# Patient Record
Sex: FEMALE | Race: OTHER | HISPANIC OR LATINO
[De-identification: names, ages, dates, MRNs, and addresses within clinical notes are randomized per-mention and may not be internally consistent; named-entity substitution may affect disease eponyms.]

---

## 2023-10-03 ENCOUNTER — APPOINTMENT (OUTPATIENT)
Dept: ANTEPARTUM | Facility: CLINIC | Age: 38
End: 2023-10-03
Payer: MEDICAID

## 2023-10-03 ENCOUNTER — ASOB RESULT (OUTPATIENT)
Age: 38
End: 2023-10-03

## 2023-10-03 PROCEDURE — 93976 VASCULAR STUDY: CPT

## 2023-10-03 PROCEDURE — 76813 OB US NUCHAL MEAS 1 GEST: CPT

## 2023-11-14 ENCOUNTER — APPOINTMENT (OUTPATIENT)
Dept: ANTEPARTUM | Facility: CLINIC | Age: 38
End: 2023-11-14

## 2023-11-21 ENCOUNTER — ASOB RESULT (OUTPATIENT)
Age: 38
End: 2023-11-21

## 2023-11-21 ENCOUNTER — APPOINTMENT (OUTPATIENT)
Dept: ANTEPARTUM | Facility: CLINIC | Age: 38
End: 2023-11-21
Payer: MEDICAID

## 2023-11-21 PROCEDURE — 76811 OB US DETAILED SNGL FETUS: CPT

## 2023-12-19 ENCOUNTER — APPOINTMENT (OUTPATIENT)
Dept: ANTEPARTUM | Facility: CLINIC | Age: 38
End: 2023-12-19
Payer: MEDICAID

## 2023-12-19 ENCOUNTER — ASOB RESULT (OUTPATIENT)
Age: 38
End: 2023-12-19

## 2023-12-19 PROCEDURE — 76816 OB US FOLLOW-UP PER FETUS: CPT

## 2024-01-16 ENCOUNTER — APPOINTMENT (OUTPATIENT)
Dept: ANTEPARTUM | Facility: CLINIC | Age: 39
End: 2024-01-16
Payer: MEDICAID

## 2024-01-16 ENCOUNTER — ASOB RESULT (OUTPATIENT)
Age: 39
End: 2024-01-16

## 2024-01-16 PROCEDURE — 76820 UMBILICAL ARTERY ECHO: CPT | Mod: 59

## 2024-01-16 PROCEDURE — 76819 FETAL BIOPHYS PROFIL W/O NST: CPT

## 2024-01-16 PROCEDURE — 76816 OB US FOLLOW-UP PER FETUS: CPT

## 2024-02-06 ENCOUNTER — ASOB RESULT (OUTPATIENT)
Age: 39
End: 2024-02-06

## 2024-02-06 ENCOUNTER — APPOINTMENT (OUTPATIENT)
Dept: ANTEPARTUM | Facility: CLINIC | Age: 39
End: 2024-02-06
Payer: MEDICAID

## 2024-02-06 PROCEDURE — 76816 OB US FOLLOW-UP PER FETUS: CPT

## 2024-02-06 PROCEDURE — 76820 UMBILICAL ARTERY ECHO: CPT | Mod: 59

## 2024-02-06 PROCEDURE — 76819 FETAL BIOPHYS PROFIL W/O NST: CPT

## 2024-03-05 ENCOUNTER — ASOB RESULT (OUTPATIENT)
Age: 39
End: 2024-03-05

## 2024-03-05 ENCOUNTER — APPOINTMENT (OUTPATIENT)
Dept: ANTEPARTUM | Facility: CLINIC | Age: 39
End: 2024-03-05
Payer: MEDICAID

## 2024-03-05 PROCEDURE — 76820 UMBILICAL ARTERY ECHO: CPT | Mod: 59

## 2024-03-05 PROCEDURE — 76816 OB US FOLLOW-UP PER FETUS: CPT

## 2024-03-05 PROCEDURE — 76817 TRANSVAGINAL US OBSTETRIC: CPT

## 2024-03-05 PROCEDURE — 76818 FETAL BIOPHYS PROFILE W/NST: CPT

## 2024-03-05 PROCEDURE — 76821 MIDDLE CEREBRAL ARTERY ECHO: CPT | Mod: 59

## 2024-03-12 ENCOUNTER — APPOINTMENT (OUTPATIENT)
Dept: ANTEPARTUM | Facility: CLINIC | Age: 39
End: 2024-03-12
Payer: MEDICAID

## 2024-03-12 ENCOUNTER — ASOB RESULT (OUTPATIENT)
Age: 39
End: 2024-03-12

## 2024-03-12 PROCEDURE — 76820 UMBILICAL ARTERY ECHO: CPT | Mod: 59

## 2024-03-12 PROCEDURE — 76821 MIDDLE CEREBRAL ARTERY ECHO: CPT | Mod: 59

## 2024-03-12 PROCEDURE — 76818 FETAL BIOPHYS PROFILE W/NST: CPT

## 2024-03-19 ENCOUNTER — APPOINTMENT (OUTPATIENT)
Dept: ANTEPARTUM | Facility: CLINIC | Age: 39
End: 2024-03-19
Payer: MEDICAID

## 2024-03-19 ENCOUNTER — ASOB RESULT (OUTPATIENT)
Age: 39
End: 2024-03-19

## 2024-03-19 PROCEDURE — 76820 UMBILICAL ARTERY ECHO: CPT | Mod: 59

## 2024-03-19 PROCEDURE — 76817 TRANSVAGINAL US OBSTETRIC: CPT

## 2024-03-19 PROCEDURE — 76816 OB US FOLLOW-UP PER FETUS: CPT

## 2024-03-19 PROCEDURE — 76819 FETAL BIOPHYS PROFIL W/O NST: CPT

## 2024-03-22 ENCOUNTER — OUTPATIENT (OUTPATIENT)
Dept: OUTPATIENT SERVICES | Facility: HOSPITAL | Age: 39
LOS: 1 days | End: 2024-03-22
Payer: MEDICAID

## 2024-03-22 VITALS
OXYGEN SATURATION: 98 % | HEART RATE: 70 BPM | DIASTOLIC BLOOD PRESSURE: 80 MMHG | RESPIRATION RATE: 18 BRPM | SYSTOLIC BLOOD PRESSURE: 124 MMHG | TEMPERATURE: 98 F

## 2024-03-22 VITALS
RESPIRATION RATE: 18 BRPM | DIASTOLIC BLOOD PRESSURE: 80 MMHG | OXYGEN SATURATION: 98 % | SYSTOLIC BLOOD PRESSURE: 124 MMHG | TEMPERATURE: 98 F | HEART RATE: 90 BPM

## 2024-03-22 DIAGNOSIS — O26.899 OTHER SPECIFIED PREGNANCY RELATED CONDITIONS, UNSPECIFIED TRIMESTER: ICD-10-CM

## 2024-03-22 DIAGNOSIS — Z98.890 OTHER SPECIFIED POSTPROCEDURAL STATES: Chronic | ICD-10-CM

## 2024-03-22 PROCEDURE — 99214 OFFICE O/P EST MOD 30 MIN: CPT

## 2024-03-22 PROCEDURE — 76818 FETAL BIOPHYS PROFILE W/NST: CPT

## 2024-03-22 PROCEDURE — 59025 FETAL NON-STRESS TEST: CPT | Mod: 26,59

## 2024-03-22 PROCEDURE — 76818 FETAL BIOPHYS PROFILE W/NST: CPT | Mod: 26

## 2024-03-22 PROCEDURE — 59025 FETAL NON-STRESS TEST: CPT

## 2024-03-22 PROCEDURE — 99212 OFFICE O/P EST SF 10 MIN: CPT | Mod: TH,25,GC

## 2024-03-22 RX ORDER — METOCLOPRAMIDE HCL 10 MG
10 TABLET ORAL ONCE
Refills: 0 | Status: DISCONTINUED | OUTPATIENT
Start: 2024-03-22 | End: 2024-04-05

## 2024-03-22 RX ORDER — MECLIZINE HCL 12.5 MG
25 TABLET ORAL ONCE
Refills: 0 | Status: DISCONTINUED | OUTPATIENT
Start: 2024-03-22 | End: 2024-04-05

## 2024-03-22 NOTE — OB RN TRIAGE NOTE - NSNURSINGINSTR_OBGYN_ALL_OB_FT
Pt discharged home. Dr. Wallace reviewed discharge paperwork with patient. All questions and concerns were addressed. Pt verbalized understanding. No further complaints made at this time. Vital signs WNL. Pt stable upon leaving unit.

## 2024-03-22 NOTE — OB PROVIDER TRIAGE NOTE - HISTORY OF PRESENT ILLNESS
ROLY IQBAL is a 37yo  at 37+6  presenting for lightheadedness and dizziness after waking up this morning at 5:45am. She also had episode of nausea. No vomiting or blurry vision. No headache, chest pain, SOB, RUQ or epigastric pain. Denies LOF, VB, contractions. +FM.     Ante: Spontaneous conception. NIPT low risk. Anatomy scan significant for vilamentous cord insertion with vessels near right cervix connected the two lobes of the placenta - posterior placenta with anterior accessory lobe. Passed GCT. Denies elevated BP. GBS unknown. EFW 2900g.     OBHx: G1 - VTOP D&C    G2 - current   GYNHx: denies fibroids, cysts STIs. Previous abnormal pap smear many years aog, normal colpo     PMH: denies  PSH: denies   Meds: PNV  Allergies: Naproxen - hives     PE:  T(C): --  HR: 90 (24 @ 10:09) (90 - 90)  BP: 124/80 (24 @ 10:09) (124/80 - 124/80)  RR: 18 (24 @ 10:09) (18 - 18)  SpO2: 98% (24 @ 10:09) (98% - 98%)  GEN: well-appearing, NAD  PULM: no increased WOB  ABD: soft, nontender, gravid  EXT: mild LE edema  TAUS: ***  SVE: ***    FHT: baseline ***, moderate variability, +accels, no decels  TOCO: ctx ***  reactive & reassuring     A&P: 37yo G*P* at *** presents ***. Fetal status reassuring.   - Admit to L&D  - Consents signed  - PN reviewed, GBS***  - NPO, IVF  - continuous tocometry, FHT   - IOL with ***    D/W    D/W   ROLY IQBAL is a 37yo  at 37+6  presenting for dizziness after waking up this morning at 5:45am. Patient describes the sensation as the room spinning around her. She reports the vertigo is constant. She also had episode of nausea. No vomiting or blurry vision. No headache, chest pain, SOB, RUQ or epigastric pain. Denies LOF, VB, contractions. +FM. Patient reports that she has not had vertigo this pregnancy before; however, she had episode 15 years ago that spontaneously resolved after a couple of days.     Ante: Spontaneous conception. NIPT low risk. Anatomy scan significant for vilamentous cord insertion with vessels near right cervix connected the two lobes of the placenta - posterior placenta with anterior accessory lobe. Passed GCT. Denies elevated BP. GBS unknown. EFW 2900g.     OBHx: G1 - VTOP D&C    G2 - current   GYNHx: denies fibroids, cysts STIs. Previous abnormal pap smear many years aog, normal colpo     PMH: denies  PSH: denies   Meds: PNV  Allergies: Naproxen - hives     PE:  T(C): --  HR: 90 (24 @ 10:09) (90 - 90)  BP: 124/80 (24 @ 10:09) (124/80 - 124/80)  RR: 18 (24 @ 10:09) (18 - 18)  SpO2: 98% (24 @ 10:09) (98% - 98%)  GEN: well-appearing, NAD  PULM: no increased WOB  ABD: soft, nontender, gravid  EXT: mild LE edema  TAUS: cephalic, BPP 8/8, MVP 4.3cm   SVE: Fingertip/long/posterior     FHT: baseline 135, moderate variability, +accels, no decels  TOCO: no contractions   reactive & reassuring     A&P: 37yo  at 37+6 presents for symptoms of vertigo.  Fetal status reassuring. Patient hemodynamically and clinically stable.   - Recommend meclizine and reglan for symptoms; however, patient declined medications at this time as she feels it will resolved shortly on its own. Discussed importance of eating small meal immediately when awakening and explained importance of not quickly adjusting her positions as to avoid any syncopal episodes or worsening or vertigo.   - Discussed strict return precautions. Reviewed s/s of labor. Reviewed s/s of preeclampsia     D/W Dr. Dawson   D/W Dr. Haskins

## 2024-03-22 NOTE — OB RN TRIAGE NOTE - FALL HARM RISK - HARM RISK INTERVENTIONS

## 2024-03-22 NOTE — OB PROVIDER TRIAGE NOTE - NSOBPROVIDERNOTE_OBGYN_ALL_OB_FT
Subjective:  ROLY IQBAL is a 39 yo  at 37w6d presents for contractions and vaginal bleeding. She began noticing 1 or 2 spots on her underwear today after she was seen an examined in triage today. She reports 2 spots of dark brown blood on her underwear without any active bleeding. Additionally, she is endorsing irregular and moderately painful contractions every 3 minutes. She is not wanting an epidural at this time. She reports good fetal movements; denies leakage of fluid.  She was previously seen in triage this morning for dizziness noted to be fingertip and long, and was discharged in stable condition.    Ante: Spontaneous conception. NIPT low risk. Anatomy scan significant for velamentous cord insertion with vessels near right cervix connected the two lobes of the placenta - posterior placenta with anterior accessory lobe. Passed GCT. Denies elevated BP. GBS unknown. EFW 2900g.     OB: G1 - VTOP D&C ; G2 - current   GYN: denies fibroids, cysts STIs. Previous abnormal pap smear many years ago, normal colpo     PMH: denies  PSH: denies   Meds: PNV  Allergies: Naproxen - hives           Objective:    T(C): 36.7 (24 @ 23:04), Max: 36.7 (24 @ 10:09)  HR: 70 (24 @ 23:04) (70 - 90)  BP: 124/80 (-24 @ 23:04) (124/80 - 124/80)  RR: 18 (24 @ 23:04) (18 - 18)  SpO2: 98% (24 @ 23:04) (98% - 98%)    General: No apparent distress; Lying comfortably in bed  Pulmonary: No increased work of breathing  Abdomen: Soft; Nontender; Gravid  Extremities: Trace pedal edema bilaterally; No calf tenderness bilaterally  Neurological: Gross movements intact  Psychiatric: Appropriate mood and affect  Skin: No rashes or jaundice    SVE: fingertip/long  TAUS: cephalic fetus; anterior/posterior placenta; BPP 8/8; DVP 6.0cm  (sonogram attached)    FHT: baseline 125; moderate variability; +accels; -decels; reactive and reassuring   TOCO: irregular contractions q2-4min                      Assessment:  39 yo  at 37w6d presents with contractions and minimal vaginal spotting. Minimal concern for active or concerning bleeding. Patient endorsing symptoms of early labor however making minimal cervical change. Fetal status is reassuring.      Plan:  - Discharge home in stable condition  - Discussed strict labor precautions with patient including leakage of fluid, decreased fetal movements, heavy vaginal bleeding, or more painful/regular contractions  - All questions were answered and concerns addressed. Patient verbally expressed understanding.  - Discussed with senior resident Dr. Wallace  - Discussed with attending physician Dr. Bazan

## 2024-03-22 NOTE — OB RN TRIAGE NOTE - NS_SOURCEOFINFO_OBGYN_ALL_OB
Caller would like to discuss an/a Return call for work excuse. Writer advised caller of callback within 24-72 hours.    Patient Name: Santiago M Thompson  Caller Name: self  Name of Facility: [facility] Response: Call vs. Reply to [email or web address] Message  Callback Number: [phone]  Best Availability: anytime  Can A Detailed Message Be left? yes  Fax Number: na  Additional Info: mother is calling to get work excuse for patient stating he is still under care for his mental health issues. He will need this excuse through next week when he sees provider. Please call to discuss   Did you confirm the message with the caller?: yes    Thank you,  Shari Davenport  
Is it ok to provider letter?   
Letter created and sent via [employer] [facility]    Also spoke to giovanna and informed   
Ok for letter  
Patient

## 2024-03-25 DIAGNOSIS — O26.853 SPOTTING COMPLICATING PREGNANCY, THIRD TRIMESTER: ICD-10-CM

## 2024-03-25 DIAGNOSIS — R42 DIZZINESS AND GIDDINESS: ICD-10-CM

## 2024-03-25 DIAGNOSIS — O43.123 VELAMENTOUS INSERTION OF UMBILICAL CORD, THIRD TRIMESTER: ICD-10-CM

## 2024-03-25 DIAGNOSIS — Z3A.37 37 WEEKS GESTATION OF PREGNANCY: ICD-10-CM

## 2024-03-25 DIAGNOSIS — O09.523 SUPERVISION OF ELDERLY MULTIGRAVIDA, THIRD TRIMESTER: ICD-10-CM

## 2024-03-25 DIAGNOSIS — O26.893 OTHER SPECIFIED PREGNANCY RELATED CONDITIONS, THIRD TRIMESTER: ICD-10-CM

## 2024-03-25 DIAGNOSIS — R11.0 NAUSEA: ICD-10-CM

## 2024-03-25 DIAGNOSIS — O47.1 FALSE LABOR AT OR AFTER 37 COMPLETED WEEKS OF GESTATION: ICD-10-CM

## 2024-03-26 ENCOUNTER — APPOINTMENT (OUTPATIENT)
Dept: ANTEPARTUM | Facility: CLINIC | Age: 39
End: 2024-03-26

## 2024-04-02 ENCOUNTER — ASOB RESULT (OUTPATIENT)
Age: 39
End: 2024-04-02

## 2024-04-02 ENCOUNTER — APPOINTMENT (OUTPATIENT)
Dept: ANTEPARTUM | Facility: CLINIC | Age: 39
End: 2024-04-02
Payer: MEDICAID

## 2024-04-02 PROBLEM — Z78.9 OTHER SPECIFIED HEALTH STATUS: Chronic | Status: ACTIVE | Noted: 2024-03-22

## 2024-04-02 PROCEDURE — 76816 OB US FOLLOW-UP PER FETUS: CPT

## 2024-04-02 PROCEDURE — 76820 UMBILICAL ARTERY ECHO: CPT | Mod: 59

## 2024-04-02 PROCEDURE — 76819 FETAL BIOPHYS PROFIL W/O NST: CPT

## 2024-04-02 PROCEDURE — 76817 TRANSVAGINAL US OBSTETRIC: CPT

## 2024-04-03 PROBLEM — Z00.00 ENCOUNTER FOR PREVENTIVE HEALTH EXAMINATION: Status: ACTIVE | Noted: 2024-04-03

## 2024-04-08 ENCOUNTER — OUTPATIENT (OUTPATIENT)
Dept: OUTPATIENT SERVICES | Facility: HOSPITAL | Age: 39
LOS: 1 days | End: 2024-04-08
Payer: MEDICAID

## 2024-04-08 DIAGNOSIS — Z01.818 ENCOUNTER FOR OTHER PREPROCEDURAL EXAMINATION: ICD-10-CM

## 2024-04-08 DIAGNOSIS — Z98.890 OTHER SPECIFIED POSTPROCEDURAL STATES: Chronic | ICD-10-CM

## 2024-04-08 LAB
ANION GAP SERPL CALC-SCNC: 10 MMOL/L — SIGNIFICANT CHANGE UP (ref 5–17)
BASOPHILS # BLD AUTO: 0.03 K/UL — SIGNIFICANT CHANGE UP (ref 0–0.2)
BASOPHILS NFR BLD AUTO: 0.4 % — SIGNIFICANT CHANGE UP (ref 0–2)
BLD GP AB SCN SERPL QL: NEGATIVE — SIGNIFICANT CHANGE UP
BUN SERPL-MCNC: 4 MG/DL — LOW (ref 7–23)
CALCIUM SERPL-MCNC: 9.9 MG/DL — SIGNIFICANT CHANGE UP (ref 8.4–10.5)
CHLORIDE SERPL-SCNC: 105 MMOL/L — SIGNIFICANT CHANGE UP (ref 96–108)
CO2 SERPL-SCNC: 22 MMOL/L — SIGNIFICANT CHANGE UP (ref 22–31)
CREAT SERPL-MCNC: 0.6 MG/DL — SIGNIFICANT CHANGE UP (ref 0.5–1.3)
EGFR: 116 ML/MIN/1.73M2 — SIGNIFICANT CHANGE UP
EOSINOPHIL # BLD AUTO: 0.07 K/UL — SIGNIFICANT CHANGE UP (ref 0–0.5)
EOSINOPHIL NFR BLD AUTO: 0.9 % — SIGNIFICANT CHANGE UP (ref 0–6)
GLUCOSE SERPL-MCNC: 84 MG/DL — SIGNIFICANT CHANGE UP (ref 70–99)
HCT VFR BLD CALC: 35.4 % — SIGNIFICANT CHANGE UP (ref 34.5–45)
HGB BLD-MCNC: 12 G/DL — SIGNIFICANT CHANGE UP (ref 11.5–15.5)
IMM GRANULOCYTES NFR BLD AUTO: 1.4 % — HIGH (ref 0–0.9)
LYMPHOCYTES # BLD AUTO: 2 K/UL — SIGNIFICANT CHANGE UP (ref 1–3.3)
LYMPHOCYTES # BLD AUTO: 25.7 % — SIGNIFICANT CHANGE UP (ref 13–44)
MCHC RBC-ENTMCNC: 32.6 PG — SIGNIFICANT CHANGE UP (ref 27–34)
MCHC RBC-ENTMCNC: 33.9 GM/DL — SIGNIFICANT CHANGE UP (ref 32–36)
MCV RBC AUTO: 96.2 FL — SIGNIFICANT CHANGE UP (ref 80–100)
MONOCYTES # BLD AUTO: 0.46 K/UL — SIGNIFICANT CHANGE UP (ref 0–0.9)
MONOCYTES NFR BLD AUTO: 5.9 % — SIGNIFICANT CHANGE UP (ref 2–14)
NEUTROPHILS # BLD AUTO: 5.1 K/UL — SIGNIFICANT CHANGE UP (ref 1.8–7.4)
NEUTROPHILS NFR BLD AUTO: 65.7 % — SIGNIFICANT CHANGE UP (ref 43–77)
NRBC # BLD: 0 /100 WBCS — SIGNIFICANT CHANGE UP (ref 0–0)
PLATELET # BLD AUTO: 207 K/UL — SIGNIFICANT CHANGE UP (ref 150–400)
POTASSIUM SERPL-MCNC: 4.3 MMOL/L — SIGNIFICANT CHANGE UP (ref 3.5–5.3)
POTASSIUM SERPL-SCNC: 4.3 MMOL/L — SIGNIFICANT CHANGE UP (ref 3.5–5.3)
RBC # BLD: 3.68 M/UL — LOW (ref 3.8–5.2)
RBC # FLD: 12.7 % — SIGNIFICANT CHANGE UP (ref 10.3–14.5)
RH IG SCN BLD-IMP: POSITIVE — SIGNIFICANT CHANGE UP
SODIUM SERPL-SCNC: 137 MMOL/L — SIGNIFICANT CHANGE UP (ref 135–145)
WBC # BLD: 7.77 K/UL — SIGNIFICANT CHANGE UP (ref 3.8–10.5)
WBC # FLD AUTO: 7.77 K/UL — SIGNIFICANT CHANGE UP (ref 3.8–10.5)

## 2024-04-08 PROCEDURE — 86900 BLOOD TYPING SEROLOGIC ABO: CPT

## 2024-04-08 PROCEDURE — 80048 BASIC METABOLIC PNL TOTAL CA: CPT

## 2024-04-08 PROCEDURE — 86901 BLOOD TYPING SEROLOGIC RH(D): CPT

## 2024-04-08 PROCEDURE — 86850 RBC ANTIBODY SCREEN: CPT

## 2024-04-08 PROCEDURE — 85025 COMPLETE CBC W/AUTO DIFF WBC: CPT

## 2024-04-09 ENCOUNTER — OUTPATIENT (OUTPATIENT)
Dept: OUTPATIENT SERVICES | Facility: HOSPITAL | Age: 39
LOS: 1 days | End: 2024-04-09
Payer: MEDICAID

## 2024-04-09 VITALS
DIASTOLIC BLOOD PRESSURE: 76 MMHG | RESPIRATION RATE: 18 BRPM | OXYGEN SATURATION: 99 % | TEMPERATURE: 98 F | HEART RATE: 75 BPM | SYSTOLIC BLOOD PRESSURE: 127 MMHG

## 2024-04-09 DIAGNOSIS — Z98.890 OTHER SPECIFIED POSTPROCEDURAL STATES: Chronic | ICD-10-CM

## 2024-04-09 DIAGNOSIS — O26.899 OTHER SPECIFIED PREGNANCY RELATED CONDITIONS, UNSPECIFIED TRIMESTER: ICD-10-CM

## 2024-04-09 PROCEDURE — 99214 OFFICE O/P EST MOD 30 MIN: CPT

## 2024-04-09 NOTE — OB PROVIDER TRIAGE NOTE - HISTORY OF PRESENT ILLNESS
ROLY IQBAL is a 37yo  at 40w3d presenting for painful ctx. She started yazmin last night, now reports contractions are "1000 out of 10" happening "constantly". She denies LOF, VB, and is feeling the baby move. She does not want an epidural and wants to go unmedicated through labor including avoiding Pitocin "unless absolutely necessary."     Ante: Spontaneous conception. NIPT LR. Anatomy scan wnl. Velamentous cord insertion with two placental lobes, no vasa previa. Passed GCT. Denies elevated BP. GBS-. EFW 3175g    OBHx: G1 VTOP D&C . G2 current  GYNHx: abnormal pap s/p colposcopy in , pap normal since, no LEEP. She denies STI, fibroids, ovarian cysts  PMHx: denies  meds: PNV  PSH: denies   allergies: Aleve (hives) takes ibuprofen without reaction    PE:  T(C): 36.5 (24 @ 06:17), Max: 36.5 (24 @ 06:17)  HR: 75 (24 @ 06:17) (75 - 75)  BP: 127/76 (24 @ 06:17) (127/76 - 127/76)  RR: 18 (24 @ 06:17) (18 - 18)  SpO2: 99% (24 @ 06:17) (99% - 99%)  GEN: well-appearing, NAD  PULM: no increased WOB  ABD: soft, nontender, gravid  EXT: mild LE edema  TAUS: vertex, BPP 8/8, DVP 4.1cm, posterior placenta   ultrasound printed and attached to chart   SVE: 1/L    FHT: baseline 135, moderate variability, +accels, no decels  TOCO: ctx q2-3min  reactive & reassuring     A&P:  37yo  at 40w3d presenting in early labor. Patient does not desire epidural and would like to avoid augmentation of labor. Discussed that given fetal status reassuring with BPP 10/10 patient can continue to labor at home (lives 15min away in the [place]) until ctx q2min and becoming more intense and/or if feeling rectal pressure. Also counseled to return if LOF, VB, or decreased fetal movements. Patient and partner in agreement with plan.   - safe to d/c home  - reviewed return precautions as above     D/W Dr. James PGY3  D/W Dr. Choco MARTINEZ attending

## 2024-04-09 NOTE — OB PROVIDER H&P - HISTORY OF PRESENT ILLNESS
ROLY IQBAL is a 39yo  at 40w3d presenting for increasing painful contractions. Pt reports that contractions started at 10:30pm happening every 10 min, and overnight progressed to being every 1-4 minutes. Pt follows with Dr. Reza and was scheduled to come in for induction tomorrow. She reports being able to feel baby kick. She reports no vaginal leaking of fluid or blood.       Ante: Spontaneous conception. NIPT HR. Anatomy scan wnl. Passed GCT. Denies elevated BP. GBS negative. EFW 3175 grams.     OBHx: G1 - . VTOP - procedure             G2 - current - has vilamentous cord insertion   GYNHx: hx of one abnormal pap in  for which she had colposcopy which was normal, no other STI, small anterior fibroid, hx of one small ovarian cysts last year that has resolved  PMHx: denies  meds: PNV, iron tablet QD  PSH: none  allergies: none     PE:  Vital Signs Last 24 Hrs  T(C): 36.5 (2024 06:17), Max: 36.5 (2024 06:17)  T(F): 97.7 (2024 06:17), Max: 97.7 (2024 06:17)  HR: 75 (2024 06:17) (75 - 75)  BP: 127/76 (2024 06:17) (127/76 - 127/76)  RR: 18 (2024 06:17) (18 - 18)  SpO2: 99% (2024 06:17) (99% - 99%)  GEN: well-appearing, NAD  PULM: no increased WOB  ABD: soft, nontender, gravid  EXT: mild LE edema  TAUS: cephalic  ultrasound printed and attached to chart   SVE: 1cm/long/-3    FHT: baseline 140, moderate variability, +accels, no decels  TOCO: ctx every 1-2 mins  reactive & reassuring     A&P: 39yo G* at * presents *. Fetal status reassuring. Cervix 1cm dilated.  - Pt does not want epidural or induction at this time   - Discussed with pt about laboring at home and returning when cervix is dilated.     D/W    D/W

## 2024-04-09 NOTE — OB RN TRIAGE NOTE - NSNURSINGINSTR_OBGYN_ALL_OB_FT
Pt discharged home. Dr. Vargas reviewed discharge instructions with patient. All questions and concerns were addressed. Pt verbalized understanding. No further complaints made at this time. Pt stable upon leaving unit.

## 2024-04-09 NOTE — OB RN TRIAGE NOTE - HOW OFTEN DO YOU HAVE A DRINK CONTAINING ALCOHOL?
How Severe Are Your Spot(S)?: mild What Type Of Note Output Would You Prefer (Optional)?: Standard Output What Is The Reason For Today's Visit?: Full Body Skin Examination What Is The Reason For Today's Visit? (Being Monitored For X): concerning skin lesions on a periodic basis Never

## 2024-04-09 NOTE — OB RN TRIAGE NOTE - FALL HARM RISK - HARM RISK INTERVENTIONS

## 2024-04-10 ENCOUNTER — INPATIENT (INPATIENT)
Facility: HOSPITAL | Age: 39
LOS: 3 days | Discharge: HOME | End: 2024-04-14
Attending: OBSTETRICS & GYNECOLOGY | Admitting: OBSTETRICS & GYNECOLOGY
Payer: MEDICAID

## 2024-04-10 VITALS
HEART RATE: 75 BPM | DIASTOLIC BLOOD PRESSURE: 80 MMHG | HEIGHT: 65 IN | TEMPERATURE: 97 F | RESPIRATION RATE: 16 BRPM | SYSTOLIC BLOOD PRESSURE: 125 MMHG | WEIGHT: 160.06 LBS | OXYGEN SATURATION: 98 %

## 2024-04-10 DIAGNOSIS — Z98.890 OTHER SPECIFIED POSTPROCEDURAL STATES: Chronic | ICD-10-CM

## 2024-04-10 DIAGNOSIS — Z3A.40 40 WEEKS GESTATION OF PREGNANCY: ICD-10-CM

## 2024-04-10 DIAGNOSIS — O43.123 VELAMENTOUS INSERTION OF UMBILICAL CORD, THIRD TRIMESTER: ICD-10-CM

## 2024-04-10 DIAGNOSIS — O09.523 SUPERVISION OF ELDERLY MULTIGRAVIDA, THIRD TRIMESTER: ICD-10-CM

## 2024-04-10 DIAGNOSIS — O47.1 FALSE LABOR AT OR AFTER 37 COMPLETED WEEKS OF GESTATION: ICD-10-CM

## 2024-04-10 DIAGNOSIS — O48.0 POST-TERM PREGNANCY: ICD-10-CM

## 2024-04-10 RX ORDER — CHLORHEXIDINE GLUCONATE 213 G/1000ML
1 SOLUTION TOPICAL DAILY
Refills: 0 | Status: DISCONTINUED | OUTPATIENT
Start: 2024-04-10 | End: 2024-04-12

## 2024-04-10 RX ORDER — SODIUM CHLORIDE 9 MG/ML
1000 INJECTION, SOLUTION INTRAVENOUS
Refills: 0 | Status: DISCONTINUED | OUTPATIENT
Start: 2024-04-10 | End: 2024-04-12

## 2024-04-10 RX ORDER — INFLUENZA VIRUS VACCINE 15; 15; 15; 15 UG/.5ML; UG/.5ML; UG/.5ML; UG/.5ML
0.5 SUSPENSION INTRAMUSCULAR ONCE
Refills: 0 | Status: COMPLETED | OUTPATIENT
Start: 2024-04-10 | End: 2024-04-10

## 2024-04-10 RX ORDER — CITRIC ACID/SODIUM CITRATE 300-500 MG
15 SOLUTION, ORAL ORAL EVERY 6 HOURS
Refills: 0 | Status: DISCONTINUED | OUTPATIENT
Start: 2024-04-10 | End: 2024-04-12

## 2024-04-10 RX ORDER — OXYTOCIN 10 UNIT/ML
333.33 VIAL (ML) INJECTION
Qty: 20 | Refills: 0 | Status: DISCONTINUED | OUTPATIENT
Start: 2024-04-10 | End: 2024-04-12

## 2024-04-10 NOTE — OB PROVIDER H&P - ASSESSMENT
39 yo  presenting for an elective IOL.  - IV fluids, NPO  - Admit to L&D  - Continuous FHT and toco monitoring. Currently reactive and reassuring.  - Prenatals reviewed. GBS negative. No indication for antibiotic prophylaxis.  - Plan: IOL with cook balloon and pitocin.  - Risks, benefits, and alternatives discussed. Consents obtained.    D/W KARISHMA NavarroC 37 yo  presenting for an elective IOL.  - IV fluids, NPO  - Admit to L&D  - Continuous FHT and toco monitoring. Currently reactive and reassuring.  - Prenatals reviewed. GBS negative result from 36 days ago, no risk factors present, no indication for antibiotic prophylaxis.  - Plan: IOL with cook balloon and pitocin.  - Risks, benefits, and alternatives discussed. Consents obtained.    D/W Dr. Choco Wilson PA-C

## 2024-04-10 NOTE — OB PROVIDER IHI INDUCTION/AUGMENTATION NOTE - NS_OBIHITYPE_OBGYN_ALL_OB
----- Message from Francoise Sibley, CRT sent at 5/31/2023  3:20 PM CDT -----  Patient missed appointments. Please reschedule amina and walk with provider appointment. Thank you.     
Induction

## 2024-04-10 NOTE — OB RN PATIENT PROFILE - BREASTFEEDING MOTHER TAUGHT HOW TO HAND EXPRESS THEIR OWN MILK
No acute changes throughout the day. VSS. AAO x 4. Pt up to chair most of the shift, currently back in bed watching TV. Midline partially removed inadvertently, completely removed to ensure pt safety and prevent risk of infection. Accuchecks continued per order. Pt weaned off oxygen, satting >88% on RA. POC reviewed with patient, possible discharge tomorrow. Bed locked in lowest position, call light within reach. WCTM.    Statement Selected

## 2024-04-10 NOTE — OB PROVIDER H&P - NSLOWPPHRISK_OBGYN_A_OB
No previous uterine incision/[place] Pregnancy/Less than or equal to 4 previous vaginal births/No known bleeding disorder

## 2024-04-10 NOTE — OB PROVIDER H&P - HISTORY OF PRESENT ILLNESS
37yo  at 40w4d presenting for an elective IOL. Patient was seen in triage yesterday for "1000 out of 10" painful contractions, did not want an epidural at the time, SVE 1/Long, and was discharged home. Today, she has not felt her contractions at all. Endorses FM, denies VB, or LOF.     Ante: Spontaneous conception. NIPT LR. Anatomy scan wnl. Velamentous cord insertion with two placental lobes, no vasa previa. Passed GCT. Denies elevated BP. GBS-. EFW 3175g    OBHx: G1 VTOP D&C . G2 current  GYNHx: abnormal pap s/p colposcopy in , pap normal since, no LEEP. She denies STI, fibroids, ovarian cysts  PMHx: denies  meds: PNV  PSH: denies   allergies: Maria Del Rosario (hives) takes ibuprofen without reaction    PE:  BP: 125/80  HR: 81  GEN: well-appearing, NAD  PULM: no increased WOB  ABD: soft, nontender, gravid  EXT: mild LE edema  SVE: deferred on admission, 1/L in triage yesterday    TAUS: Cephalic, anterior placenta  FHT Cat 1,  bpm, moderate variability, + accels, - decels.  [place]: Isai q1-3 minutes  39yo  at 40w4d presenting for an elective IOL. Patient was seen in triage yesterday for "1000 out of 10" painful contractions, did not want an epidural at the time, SVE 1/Long, and was discharged home. Today, she has not felt her contractions. Endorses FM, denies VB, or LOF.     Ante: Spontaneous conception. NIPT LR. Anatomy scan wnl. Velamentous cord insertion with two placental lobes, no vasa previa. Passed GCT. Denies elevated BP. GBS-. EFW 3175g    OBHx: G1 VTOP D&C . G2 current  GYNHx: abnormal pap s/p colposcopy in , pap normal since, no LEEP. She denies STI, fibroids, ovarian cysts  PMHx: denies  meds: PNV  PSH: denies   allergies: Maria Del Rosario (hives) takes ibuprofen without reaction    PE:  BP: 125/80  HR: 81  GEN: well-appearing, NAD  PULM: no increased WOB  ABD: soft, nontender, gravid  EXT: mild LE edema  SVE: deferred on admission, 1/L in triage yesterday    TAUS: Cephalic, anterior placenta  FHT Cat 1,  bpm, moderate variability, + accels, - decels.  [place]: Isai q1-3 minutes

## 2024-04-11 LAB
BASOPHILS # BLD AUTO: 0.03 K/UL — SIGNIFICANT CHANGE UP (ref 0–0.2)
BASOPHILS NFR BLD AUTO: 0.3 % — SIGNIFICANT CHANGE UP (ref 0–2)
BLD GP AB SCN SERPL QL: NEGATIVE — SIGNIFICANT CHANGE UP
EOSINOPHIL # BLD AUTO: 0.05 K/UL — SIGNIFICANT CHANGE UP (ref 0–0.5)
EOSINOPHIL NFR BLD AUTO: 0.6 % — SIGNIFICANT CHANGE UP (ref 0–6)
HCT VFR BLD CALC: 35 % — SIGNIFICANT CHANGE UP (ref 34.5–45)
HGB BLD-MCNC: 11.7 G/DL — SIGNIFICANT CHANGE UP (ref 11.5–15.5)
IMM GRANULOCYTES NFR BLD AUTO: 0.9 % — SIGNIFICANT CHANGE UP (ref 0–0.9)
LYMPHOCYTES # BLD AUTO: 2.75 K/UL — SIGNIFICANT CHANGE UP (ref 1–3.3)
LYMPHOCYTES # BLD AUTO: 31.5 % — SIGNIFICANT CHANGE UP (ref 13–44)
MCHC RBC-ENTMCNC: 32.8 PG — SIGNIFICANT CHANGE UP (ref 27–34)
MCHC RBC-ENTMCNC: 33.4 GM/DL — SIGNIFICANT CHANGE UP (ref 32–36)
MCV RBC AUTO: 98 FL — SIGNIFICANT CHANGE UP (ref 80–100)
MONOCYTES # BLD AUTO: 0.66 K/UL — SIGNIFICANT CHANGE UP (ref 0–0.9)
MONOCYTES NFR BLD AUTO: 7.6 % — SIGNIFICANT CHANGE UP (ref 2–14)
NEUTROPHILS # BLD AUTO: 5.15 K/UL — SIGNIFICANT CHANGE UP (ref 1.8–7.4)
NEUTROPHILS NFR BLD AUTO: 59.1 % — SIGNIFICANT CHANGE UP (ref 43–77)
NRBC # BLD: 0 /100 WBCS — SIGNIFICANT CHANGE UP (ref 0–0)
PLATELET # BLD AUTO: 195 K/UL — SIGNIFICANT CHANGE UP (ref 150–400)
RBC # BLD: 3.57 M/UL — LOW (ref 3.8–5.2)
RBC # FLD: 12.5 % — SIGNIFICANT CHANGE UP (ref 10.3–14.5)
RH IG SCN BLD-IMP: POSITIVE — SIGNIFICANT CHANGE UP
T PALLIDUM AB TITR SER: NEGATIVE — SIGNIFICANT CHANGE UP
WBC # BLD: 8.72 K/UL — SIGNIFICANT CHANGE UP (ref 3.8–10.5)
WBC # FLD AUTO: 8.72 K/UL — SIGNIFICANT CHANGE UP (ref 3.8–10.5)

## 2024-04-11 RX ORDER — OXYTOCIN 10 UNIT/ML
VIAL (ML) INJECTION
Qty: 30 | Refills: 0 | Status: DISCONTINUED | OUTPATIENT
Start: 2024-04-11 | End: 2024-04-12

## 2024-04-11 RX ORDER — FENTANYL/BUPIVACAINE/NS/PF 2MCG/ML-.1
250 PLASTIC BAG, INJECTION (ML) INJECTION
Refills: 0 | Status: DISCONTINUED | OUTPATIENT
Start: 2024-04-11 | End: 2024-04-11

## 2024-04-11 RX ORDER — ONDANSETRON 8 MG/1
8 TABLET, FILM COATED ORAL ONCE
Refills: 0 | Status: COMPLETED | OUTPATIENT
Start: 2024-04-11 | End: 2024-04-11

## 2024-04-11 RX ADMIN — SODIUM CHLORIDE 125 MILLILITER(S): 9 INJECTION, SOLUTION INTRAVENOUS at 00:22

## 2024-04-11 RX ADMIN — Medication 2 MILLIUNIT(S)/MIN: at 13:20

## 2024-04-11 RX ADMIN — ONDANSETRON 8 MILLIGRAM(S): 8 TABLET, FILM COATED ORAL at 04:40

## 2024-04-11 RX ADMIN — SODIUM CHLORIDE 125 MILLILITER(S): 9 INJECTION, SOLUTION INTRAVENOUS at 12:50

## 2024-04-11 NOTE — PRE-ANESTHESIA EVALUATION ADULT - NSANTHOSAYNRD_GEN_A_CORE
No. CHERIE screening performed.  STOP BANG Legend: 0-2 = LOW Risk; 3-4 = INTERMEDIATE Risk; 5-8 = HIGH Risk

## 2024-04-12 PROCEDURE — 88307 TISSUE EXAM BY PATHOLOGIST: CPT | Mod: 26

## 2024-04-12 RX ORDER — AER TRAVELER 0.5 G/1
1 SOLUTION RECTAL; TOPICAL EVERY 4 HOURS
Refills: 0 | Status: DISCONTINUED | OUTPATIENT
Start: 2024-04-12 | End: 2024-04-14

## 2024-04-12 RX ORDER — IBUPROFEN 200 MG
600 TABLET ORAL EVERY 6 HOURS
Refills: 0 | Status: COMPLETED | OUTPATIENT
Start: 2024-04-12 | End: 2025-03-11

## 2024-04-12 RX ORDER — OXYCODONE HYDROCHLORIDE 5 MG/1
5 TABLET ORAL ONCE
Refills: 0 | Status: DISCONTINUED | OUTPATIENT
Start: 2024-04-12 | End: 2024-04-14

## 2024-04-12 RX ORDER — LANOLIN
1 OINTMENT (GRAM) TOPICAL EVERY 6 HOURS
Refills: 0 | Status: DISCONTINUED | OUTPATIENT
Start: 2024-04-12 | End: 2024-04-14

## 2024-04-12 RX ORDER — PRAMOXINE HYDROCHLORIDE 150 MG/15G
1 AEROSOL, FOAM RECTAL EVERY 4 HOURS
Refills: 0 | Status: DISCONTINUED | OUTPATIENT
Start: 2024-04-12 | End: 2024-04-14

## 2024-04-12 RX ORDER — TETANUS TOXOID, REDUCED DIPHTHERIA TOXOID AND ACELLULAR PERTUSSIS VACCINE, ADSORBED 5; 2.5; 8; 8; 2.5 [IU]/.5ML; [IU]/.5ML; UG/.5ML; UG/.5ML; UG/.5ML
0.5 SUSPENSION INTRAMUSCULAR ONCE
Refills: 0 | Status: COMPLETED | OUTPATIENT
Start: 2024-04-12

## 2024-04-12 RX ORDER — ACETAMINOPHEN 500 MG
975 TABLET ORAL
Refills: 0 | Status: DISCONTINUED | OUTPATIENT
Start: 2024-04-12 | End: 2024-04-14

## 2024-04-12 RX ORDER — SODIUM CHLORIDE 9 MG/ML
500 INJECTION, SOLUTION INTRAVENOUS ONCE
Refills: 0 | Status: COMPLETED | OUTPATIENT
Start: 2024-04-12 | End: 2024-04-12

## 2024-04-12 RX ORDER — IBUPROFEN 200 MG
600 TABLET ORAL EVERY 6 HOURS
Refills: 0 | Status: DISCONTINUED | OUTPATIENT
Start: 2024-04-12 | End: 2024-04-14

## 2024-04-12 RX ORDER — MAGNESIUM HYDROXIDE 400 MG/1
30 TABLET, CHEWABLE ORAL
Refills: 0 | Status: DISCONTINUED | OUTPATIENT
Start: 2024-04-12 | End: 2024-04-14

## 2024-04-12 RX ORDER — ONDANSETRON 8 MG/1
4 TABLET, FILM COATED ORAL ONCE
Refills: 0 | Status: DISCONTINUED | OUTPATIENT
Start: 2024-04-12 | End: 2024-04-14

## 2024-04-12 RX ORDER — OXYTOCIN 10 UNIT/ML
41.67 VIAL (ML) INJECTION
Qty: 20 | Refills: 0 | Status: DISCONTINUED | OUTPATIENT
Start: 2024-04-12 | End: 2024-04-14

## 2024-04-12 RX ORDER — SODIUM CHLORIDE 9 MG/ML
3 INJECTION INTRAMUSCULAR; INTRAVENOUS; SUBCUTANEOUS EVERY 8 HOURS
Refills: 0 | Status: DISCONTINUED | OUTPATIENT
Start: 2024-04-12 | End: 2024-04-14

## 2024-04-12 RX ORDER — KETOROLAC TROMETHAMINE 30 MG/ML
30 SYRINGE (ML) INJECTION ONCE
Refills: 0 | Status: DISCONTINUED | OUTPATIENT
Start: 2024-04-12 | End: 2024-04-12

## 2024-04-12 RX ORDER — SODIUM CHLORIDE 9 MG/ML
500 INJECTION, SOLUTION INTRAVENOUS
Refills: 0 | Status: DISCONTINUED | OUTPATIENT
Start: 2024-04-12 | End: 2024-04-14

## 2024-04-12 RX ORDER — OXYCODONE HYDROCHLORIDE 5 MG/1
5 TABLET ORAL
Refills: 0 | Status: DISCONTINUED | OUTPATIENT
Start: 2024-04-12 | End: 2024-04-14

## 2024-04-12 RX ORDER — HYDROCORTISONE 1 %
1 OINTMENT (GRAM) TOPICAL EVERY 6 HOURS
Refills: 0 | Status: DISCONTINUED | OUTPATIENT
Start: 2024-04-12 | End: 2024-04-14

## 2024-04-12 RX ORDER — DIBUCAINE 1 %
1 OINTMENT (GRAM) RECTAL EVERY 6 HOURS
Refills: 0 | Status: DISCONTINUED | OUTPATIENT
Start: 2024-04-12 | End: 2024-04-14

## 2024-04-12 RX ORDER — BENZOCAINE 10 %
1 GEL (GRAM) MUCOUS MEMBRANE EVERY 6 HOURS
Refills: 0 | Status: DISCONTINUED | OUTPATIENT
Start: 2024-04-12 | End: 2024-04-14

## 2024-04-12 RX ORDER — SIMETHICONE 80 MG/1
80 TABLET, CHEWABLE ORAL EVERY 4 HOURS
Refills: 0 | Status: DISCONTINUED | OUTPATIENT
Start: 2024-04-12 | End: 2024-04-14

## 2024-04-12 RX ORDER — DIPHENHYDRAMINE HCL 50 MG
25 CAPSULE ORAL EVERY 6 HOURS
Refills: 0 | Status: DISCONTINUED | OUTPATIENT
Start: 2024-04-12 | End: 2024-04-14

## 2024-04-12 RX ADMIN — SODIUM CHLORIDE 125 MILLILITER(S): 9 INJECTION, SOLUTION INTRAVENOUS at 03:30

## 2024-04-12 RX ADMIN — SODIUM CHLORIDE 3 MILLILITER(S): 9 INJECTION INTRAMUSCULAR; INTRAVENOUS; SUBCUTANEOUS at 22:00

## 2024-04-12 RX ADMIN — SODIUM CHLORIDE 500 MILLILITER(S): 9 INJECTION, SOLUTION INTRAVENOUS at 03:30

## 2024-04-12 RX ADMIN — Medication 30 MILLIGRAM(S): at 17:00

## 2024-04-12 RX ADMIN — Medication 1 SPRAY(S): at 21:28

## 2024-04-12 RX ADMIN — Medication 975 MILLIGRAM(S): at 21:28

## 2024-04-12 RX ADMIN — Medication 1 APPLICATION(S): at 21:27

## 2024-04-12 NOTE — OB PROVIDER LABOR PROGRESS NOTE - NS_SUBJECTIVE/OBJECTIVE_OBGYN_ALL_OB_FT
Assuming care of pt for night team. FHT reviewed. Baseline 120, mod variability, +accels, -decles. Irreg ctx
Note entry delayed 2/2 pt care. Pt seen at bedside at 0140 for prolonged deceleration of 3min to cornel of 70, brief return to baseline followed by 2 min decel. Pitocin immediately paused. Pt repositioned to all fours. Pt then with recurrent variable decels for which IUPC was placed and amnio infusion started. Improvement in tracing. Will continue to monitor off pitocin
Patient is complaining about pain. She received topoff, right side is numb, left side is painful. Was evaluated by anesthesia, was offered to replace the epidural catheter but declined.   VE: 9/80/-0  Thick anterior lip, not reducable.   Pitocin was on 8 miu and was d/c for recurrent variables  Will continue to closely monitor
Patient seen at bedside for 1 minute late deceleration while repositioning to her right side. SVE 7-8/80/-2, FHR spontaneously resolved without intervention. Patient repositioned to [facility].
Pt seen at bedside. Reports some cramping but overall comfortable. SVE unchanged 4/50/-3, SROM clear fluid. FHT reviewed. Baseline 125, mod variability, +accels, -decels. Irreg ctx
Coming on service until 7 pm.
Patient seen at bedside for cook balloon placement. SVE: 1/long. Uterine balloon inserted, filled with 80cc of sterile fluid, and taped to tension. Patient tolerated well without complaints.
Pt seen at bedside after epidural placement. Pt reports vaginal/urethral burning. SVE 5/80/-2. Black catheter placed by myself without difficulty. FHT reviewed. Baseline 120, mod variability, +accels, nonrecurrent early decelerations. Ctx q2-3min
EFM reviewed. Last VE 1/long with cook balloon placement.
Patient seen at bedside, complaining of nausea/vomiting and 10/10 contractions. SVE 2-3 cm around the balloon.
EFM reviewed. Last VE 2-3 cm around the balloon.
EFM reviewed. Last VE 7/80/-2.
Patient seen at bedside breathing through contractions. SVE unchanged, 4/50/-3. Patient requesting an epidural at this time.
Patient seen at bedside for serial VE, expresses that shes becoming increasingly uncomfortable with her contractions despite her epidural. SVE unchanged, 7/80/-2.
Pt seen at bedside after balloon came out. Pt feeling more comfortable and denies feeling contractions.   VE 4/50/-3. Fetal head not well applied to cervix. Pt tolerated exam.
Pt seen at bedside to offer AROM. Pt amenable to AROM.   VE 4/50/-3. Fetal head barely palpable and not well applied.  AROM attempted
Patient seen at bedside for increased rectal pressure and pain. IUPC fell out.

## 2024-04-12 NOTE — OB PROVIDER DELIVERY SUMMARY - NSPROVIDERDELIVERYNOTE_OBGYN_ALL_OB_FT
Patient 37yo  at 40+6 presented for IOL. Patient received aviles balloon and pitocin. Patient received epidural for analgesia. Delivery of live male infant in OA position. Placenta delivered spontaneously intact. Cervix inspected and found to be intact. A second degree laceration was repaired with 2-0 and 3-0 Chromic suture. EBL 300cc. Patient tolerated procedure well. Mother and baby stable.

## 2024-04-12 NOTE — OB PROVIDER LABOR PROGRESS NOTE - NS_OBIHICONTRACTIONPATTERNDETAILS_OBGYN_ALL_OB_FT
Isai q1-2 minutes on toco.
Isai q1-3 minutes on toco.
Isai q2-3 minutes on 20 mU of pitocin.
Isai q2-3 minutes on 4 mU of pitocin.
yazmin 2 in 10 minutes.
yazmin 3 in 10 minutes.
Isai q1-3 minutes on toco.
Irregular.
Isai q2-3 minutes on toco.
Isai q2-6 minutes on toco.
yazmin 2 in 10 minutes.
yazmin 3 in 10 minutes.
yazmin 3-4 in 10 minutes.
CTXs q2-3 minutes
yazmin 4 in 10 minute.s
Isai q2-4 minutes on toco.
CTXs q2-3 minutes at the time of exam.

## 2024-04-12 NOTE — OB RN DELIVERY SUMMARY - NSSELHIDDEN_OBGYN_ALL_OB_FT
[NS_DeliveryAttending1_OBGYN_ALL_OB_FT:[id number]],[NS_DeliveryAssist1_OBGYN_ALL_OB_FT:[id number]],[NS_DeliveryAssist2_OBGYN_ALL_OB_FT:[id number]],[NS_DeliveryRN_OBGYN_ALL_OB_FT:[id number]],[NS_CirculateRN2_OBGYN_ALL_OB_FT:[id number]]

## 2024-04-12 NOTE — OB NEONATOLOGY/PEDIATRICIAN DELIVERY SUMMARY - NSPEDSNEONOTESA_OBGYN_ALL_OB_FT
NICU called to delivery for cat II tracing. Baby emerged with good tone, crying, vigorous. DCC x 30 secs. Infant placed on open warmer, dried, suctioned, stimulated. At 6 mins of life infant had mild grunting. pulse ox placed on right wrist sating >90%. CPAP fio2 21% given for 1 min. Trial off CPAP and grunting resolved sating >95%.  PE showed caput, otherwise WNL. Cleared for WBN, at this time.

## 2024-04-12 NOTE — OB PROVIDER LABOR PROGRESS NOTE - ASSESSMENT
- Cat I FHT  - Epidural in situ  - S/p aviles balloon  - Pit at 20mu, cont as tolerated  - Dr. Haskins in house  - Cont current management
- Category 1   - s/p aviles balloon. Patient currently refusing pitocin. Will continue expectant management at this time. Will rediscuss pitocin during next examination. 
- cook balloon in situ  - will continue to monitor
40 p0 40+4 efw=3,200g, cat I fhr tracing, eiol post date, post aviles balloon at 4 cm. Seen patient at bedside. Case discussed with obs team. Patient to take a nap. H/p,a/p reviewed. Rediscussed procedure,etc. Patient does not want epi or pitocin for now. Will reassess exam in a few hours and possible AROM,etc. Patient understand. Expect .
FHT reviewed. Baseline 125, moderate variability, +accels, no decels  TOCO: ctx q2min  cat I     - cervical balloon in situ  - pt prefers to hold off on pitocin for now   - continue to monitor 
- Cat I FHT  - S/p aviles  - Epidural in situ  - Pit at 12mu, inc as tolerated  - Cont current management  - Will attempt AROM again  - Dr. Haskins in house
- Cat I FHT  - S/p aviles  - Pit at 18mu, cont as tolerated  - Dr. Haskins in house  - Cont current management
- Category 1   - IUPC in place. Contractions currently inadequate. Pitocin currently at 12mu. Will continue to titrate as tolerated. 
- Category 1   - Will begin pushing again soon. Currently laboring down. 
- Cook balloon in situ  - Consider pitocin if indicated  - Will continue to monitor
- Keep pitocin paused until variability improves, dextrose + LR ordered  - Epidural and IUPC in situ  - Continue amnioinfusion  - Will continue to monitor
- Plan for epidural placement  - Titrate pitocin as tolerated  - Will continue to monitor 
- Titrate pitocin as tolerated  - Epidural, IUPC, and ISE in situ  - Will continue to monitor
- Titrate pitocin as tolerated  - Plan for top off with anesthesia  - Epidural, IUPC, and ISE in situ  - Will continue to monitor
- category 1   - Pitocin currently at 2mu. Will continue to titrate pitcoin as tolerated. Will monitor closely. 
- Category 1   - Pitocin currently at 2mu. Will continue to titrate as tolerated. 
- category 2   - Will get patient top off to become comfortable. Will continue to reposition patient. Overall reassuring given 8cm dilated and moderate variability. 
- cook balloon in situ  - zofran ordered  - will continue to monitor
Cat I tracing. AROM attempted, head too high for AROM at this time.   - Plan for low dose pitocin when contractions space out. Titrate per protocol  - Plan to AROM at next exam. 
Pt is 38 year old  at 40w5d here for term IOL. Pt in early labor. s/p cook balloon. CTXs q2-3 minutes. No additional augmentation at this time. Pt declining pitocin once contractions space out.   - Plan to offer AROM w/ next exam   - Continue to monitor. 
- Cook balloon in situ  - Monitor ctx pattern to determine IOL with pitocin, patient not ammendable at this time. Will reconsider.  - Will continue to monitor

## 2024-04-12 NOTE — OB RN DELIVERY SUMMARY - NSSTERILIZETYPE_OBGYN_ALL_OB
Name: Marta Shields  YOB: 1949  Gender: female  MRN: [id number]    CC:   Chief Complaint   Patient presents with    Knee Pain     R Slava          HPI:   The pain has been present for months and is becoming worse. It hurts at night when sleeping. The pain is located over the knee. It does hurt to walk and gets worse with increased distances She now has use her Rollator walker to move around the house. She had many years ago and ORIF done by Dr. John Sanchez. The pain does not radiate down the leg. Numbness and tingling are not noted. Treatment so far has been injection therapies in the past.  Is where she is becoming more debilitated. She is interested in knee replacement surgery.           Current Outpatient Medications:     B Complex Vitamins (VITAMIN B COMPLEX PO), Take 1 tablet by mouth daily, Disp: , Rfl:     ascorbic acid (VITAMIN C) 500 MG tablet, Take by mouth, Disp: , Rfl:     aspirin 81 MG EC tablet, Take by mouth daily, Disp: , Rfl:     belatacept (NULOJIX) 250 MG SOLR, Infuse 500 mg intravenously, Disp: , Rfl:     carvedilol (COREG) 12.5 MG tablet, TAKE 1 TABLET BY MOUTH TWICE DAILY, Disp: , Rfl:     vitamin D 25 MCG (1000 UT) CAPS, Take 1,000 Units by mouth daily, Disp: , Rfl:     cyanocobalamin 250 MCG tablet, Take 1 tablet by mouth daily, Disp: , Rfl:     Dulaglutide (TRULICITY) 1.5 TW/1.9WD SOPN, Inject 1.5 mg into the skin every 7 days, Disp: , Rfl:     Glucagon (GVOKE HYPOPEN 2-PACK) 1 MG/0.2ML SOAJ, Inject 1 mg into the skin as needed, Disp: , Rfl:     insulin lispro, 1 Unit Dial, (HUMALOG KWIKPEN) 100 UNIT/ML SOPN, 3 units AC brunch plus correction 1/50>200, max daily dose 20 units, Disp: , Rfl:     insulin NPH (HUMULIN N;NOVOLIN N) 100 UNIT/ML injection pen, Inject 20 Units into the skin every morning (before breakfast), Disp: , Rfl:     mycophenolate (CELLCEPT) 250 MG capsule, TK 3 CS PO BID, Disp: , Rfl:     predniSONE (DELTASONE) 5 MG tablet, Take 5 mg by mouth daily, Disp: , Rfl:     rosuvastatin (CRESTOR) 20 MG tablet, Take 20 mg by mouth daily, Disp: , Rfl:   Allergies   Allergen Reactions    Lisinopril Other (See Comments)     cough     Past Medical History:   Diagnosis Date    Carotid artery disease ([facility] [address]     Chronic kidney disease     dialysis patient mon wed and fri    Diabetes mellitus, type II ([facility] [address]     Dyspepsia and other specified disorders of function of stomach     Gout     Hypercholesterolemia     Hypertension     Hypothyroidism     Menopausal syndrome     Neuropathy     Renal failure     s/p kidney transplant x2    Right shoulder pain     Thyroid nodule      . pmh  Past Surgical History:   Procedure Laterality Date    CATARACT REMOVAL Right     CATARACT REMOVAL Left     COLONOSCOPY N/A 2022    COLONOSCOPY/ BMI 29 ROOM 442 performed by Faisal Stevenson MD at [address] [place] [address]      right knee surgery after an accident, has pins. ORTHOPEDIC SURGERY      left rotator cuff, bilateral shoulder surg., rt. knee, left index finger    PARTIAL HYSTERECTOMY      TRANSPLANT      kidney rejected in 1994    UROLOGICAL SURGERY      [place] [address] [address]      fistula for dialysis, rt. carotid artery    VASCULAR SURGERY      right forearm.      Family History   Problem Relation Age of Onset    Heart Disease Mother          age 46    Diabetes Sister     Lupus Sister     Cancer Brother         colon    Diabetes Sister     Alcohol Abuse Father     Liver Disease Father     Stroke Mother     Hypertension Mother      Social History     Socioeconomic History    Marital status:      Spouse name: Not on file    Number of children: Not on file    Years of education: Not on file    Highest education level: Not on file   Occupational History    Not on file   Tobacco Use    Smoking status: Never    Smokeless tobacco: Never   Substance and Sexual Activity    Alcohol use: No    Drug use: No    Sexual activity: Not on file   Other Topics Concern    Not on file   Social History Narrative    Not on file     Social Determinants of Health     Financial Resource Strain: Not on file   Food Insecurity: Not on file   Transportation Needs: Not on file   Physical Activity: Not on file   Stress: Not on file   Social Connections: Not on file   Intimate Partner Violence: Not on file   Housing Stability: Not on file       Review of Systems:  As per HPI. Pertinent positives and negatives are addressed with the patient, particularly those related to musculoskeletal concerns. Non-orthopaedic concerns were referred back to the primary care physician. PHYSICAL EXAMINATION:   The patient appears their stated age and they are in no distress. The lower extremities are as described below. Circulation is normal with palpable pedal pulses bilaterally and no edema. There is no lymph adenopathy in the popliteal or malleolar region. The skin is without stasis disease distally bilaterally. Hip ROM is smooth and painless. Knee range of motion is  degrees on the right and 0-120 degrees on the left.  right knee: There is 2mm of anterior/posterior translation and 2mm of medial/lateral instability bilaterally. There is 7 degrees of varus alignment in the right knee. There is some pain to palpation over the medial joint line and also lateral joint line. Range of motion does not cause any discomfort. He has a well-healed incision over the lateral aspect of her right knee   limb lengths are equal.  The gait is noted to be with a slight trendelenburg and antalgia. Straight leg test is negative. Quadriceps strength is good. Sensation is intact to light touch bilaterally. Their judgment and insight are normal.  They are oriented to time, place and person. Their memory is good and the mood and affect appropriate. X-RAY: Views of the right knee are reviewed. 4 views standing reveal joint space loss, eburnated bone, and osteophyte formation present. X-ray impression:  Advanced degenerative joint disease of right knee    IMPRESSION:    Diagnosis Orders   1. Localized osteoarthritis of right knee  US ARTHR/ASP/INJ MAJOR JNT/BURSA RIGHT    triamcinolone acetonide (KENALOG-40) injection 40 mg      .    RECOMMENDATIONS:    Reviewed x-ray findings with the patient. Today we discussed conservative treatments such as NSAIDs and PT. To date these have not been effective for the patient. The concerns with functional limitations are increased and no longer responding to conservative measures. Due to deterioration in their quality of life the decision has been made to perform total knee replacement. We discussed specific risks associated with knee replacement. This includes a risk of infection, dislocation, or general medical risks of surgery such as stroke, heart attack, and blood clot. TKA - Today we also discussed knee replacement surgery. They are aware of the 1% risk of infection. They were also informed of the possibility of stroke, heart attack and blood clot; any of which could result in further hospitalization or death. and Procedure Note: The right knee was prepped with alcohol and injected with 40 mg of Kenalog and 2 mL of 0.5 % marcaine. Bonica.co US unit with a variable frequency (6.0-15.0 MHz) linear transducer was used to visualize the retropatellar fat pad, patella, patellar tendon, tibia, and to ensure proper intra-articular placement of medication. Injection image was stored in the patient's permanent chart. The injection was without event and I will follow them as scheduled. Approximately 30 minutes was spent reviewing the medical record, imaging, performing history and physical examination, discussing the diagnosis and treatment plan with the patient, and completing documentation for this visit. None

## 2024-04-12 NOTE — OB RN DELIVERY SUMMARY - NS_SEPSISRSKCALC_OBGYN_ALL_OB_FT
EOS calculated successfully. EOS Risk Factor: 0.5/1000 live births ([facility] national incidence); GA=40w6d; Temp=99.7; ROM=16.833; GBS='Negative'; Antibiotics='No antibiotics or any antibiotics < 2 hrs prior to birth'

## 2024-04-12 NOTE — OB PROVIDER LABOR PROGRESS NOTE - NS_OBIHIFHRDETAILS_OBGYN_ALL_OB_FT
FHT Cat 2,  bpm, minimal to moderate variability, + accels, - decels.
baseline 120, moderate variability, +accels, no decels.
baseline 120, moderate variability, no accels, no decels.
Cat I tracing. Baseline 125bpm. Moderate variability. +accels, no decels noted.
Cat I tracing. Baseline 125bpm. Moderate variability. +accels, no decels noted.
FHT Cat 1,  bpm, moderate variability, + accels, - decels.
baseline 125, moderate variability, +accels, no decels.
baseline 130, moderate variability, +accels, no decels.
FHT Cat 1,  bpm, moderate variability, + accels, - decels.
baseline 120, moderate variability, no accels, intermittent variable decels.
baseline 120, moderate variablity, +accels, no decels.
Cat I fhr tracing at 125's
FHT Cat 1,  bpm, moderate variability, + accels, - decels.
FHT Cat 2,  bpm, moderate variability, + accels, + nonrecurrent late decel with spontaneous resolution. Moderate variability and accels overall reactive and reassuring.
FHT Cat 1,  bpm, moderate variability, + accels, - decels.

## 2024-04-12 NOTE — OB PROVIDER DELIVERY SUMMARY - NSSELHIDDEN_OBGYN_ALL_OB_FT
[NS_DeliveryAttending1_OBGYN_ALL_OB_FT:[id number]],[NS_DeliveryAssist1_OBGYN_ALL_OB_FT:[id number]],[NS_DeliveryAssist2_OBGYN_ALL_OB_FT:[id number]],[NS_DeliveryRN_OBGYN_ALL_OB_FT:[id number]],[NS_CirculateRN2_OBGYN_ALL_OB_FT:[id number]] done

## 2024-04-13 ENCOUNTER — TRANSCRIPTION ENCOUNTER (OUTPATIENT)
Age: 39
End: 2024-04-13

## 2024-04-13 RX ORDER — POLYETHYLENE GLYCOL 3350 17 G/17G
17 POWDER, FOR SOLUTION ORAL DAILY
Refills: 0 | Status: DISCONTINUED | OUTPATIENT
Start: 2024-04-13 | End: 2024-04-14

## 2024-04-13 RX ADMIN — Medication 975 MILLIGRAM(S): at 21:10

## 2024-04-13 RX ADMIN — Medication 600 MILLIGRAM(S): at 00:15

## 2024-04-13 RX ADMIN — Medication 975 MILLIGRAM(S): at 15:44

## 2024-04-13 RX ADMIN — Medication 600 MILLIGRAM(S): at 18:15

## 2024-04-13 RX ADMIN — Medication 975 MILLIGRAM(S): at 21:45

## 2024-04-13 RX ADMIN — Medication 600 MILLIGRAM(S): at 06:26

## 2024-04-13 RX ADMIN — Medication 975 MILLIGRAM(S): at 02:10

## 2024-04-13 RX ADMIN — Medication 600 MILLIGRAM(S): at 11:56

## 2024-04-13 RX ADMIN — Medication 975 MILLIGRAM(S): at 10:02

## 2024-04-13 RX ADMIN — Medication 1 APPLICATION(S): at 12:43

## 2024-04-13 NOTE — PROGRESS NOTE ADULT - ASSESSMENT
A/P 38y s/p , PPD# 1, stable, meeting postpartum milestones   - Pain: well controlled on tylenol/motrin  - GI: Tolerating regular diet  - : urinating without difficulty/pain  - DVT prophylaxis: ambulating frequently  - Dispo: PPD 2, unless otherwise specified

## 2024-04-13 NOTE — LACTATION INITIAL EVALUATION - LACTATION INTERVENTIONS
Infant too sleepy, unable to obtain latch after few try, Pumping initiated with hospital grade electric pump(27mm shield), after 20minutes of pumping no colostrum collected. Infant fed with formula that mom brought own (formula verified with pediatrician and primary RN). Triple feeding education reviewed with parents, will f/u as needed/initiate/review safe skin-to-skin/initiate/review hand expression/initiate/review pumping guidelines and safe milk handling/initiate/review techniques for position and latch/post discharge community resources provided/initiate/review supplementation plan due to medical indications/review techniques to increase milk supply/review techniques to manage sore nipples/engorgement/initiate/review finger suck/initiate/review breast massage/compression/initiate/review alternate feeding method/reviewed components of an effective feeding and at least 8 effective feedings per day required/reviewed importance of monitoring infant diapers, the breastfeeding log, and minimum output each day/reviewed risks of unnecessary formula supplementation/reviewed risks of artificial nipples/reviewed strategies to transition to breastfeeding only/reviewed benefits and recommendations for rooming in/reviewed feeding on demand/by cue at least 8 times a day/recommended follow-up with pediatrician within 24 hours of discharge/reviewed indications of inadequate milk transfer that would require supplementation

## 2024-04-14 VITALS
OXYGEN SATURATION: 98 % | TEMPERATURE: 98 F | RESPIRATION RATE: 16 BRPM | HEART RATE: 78 BPM | DIASTOLIC BLOOD PRESSURE: 74 MMHG | SYSTOLIC BLOOD PRESSURE: 127 MMHG

## 2024-04-14 PROCEDURE — 59050 FETAL MONITOR W/REPORT: CPT

## 2024-04-14 PROCEDURE — 86900 BLOOD TYPING SEROLOGIC ABO: CPT

## 2024-04-14 PROCEDURE — 86901 BLOOD TYPING SEROLOGIC RH(D): CPT

## 2024-04-14 PROCEDURE — 36415 COLL VENOUS BLD VENIPUNCTURE: CPT

## 2024-04-14 PROCEDURE — 86780 TREPONEMA PALLIDUM: CPT

## 2024-04-14 PROCEDURE — 85025 COMPLETE CBC W/AUTO DIFF WBC: CPT

## 2024-04-14 PROCEDURE — 86850 RBC ANTIBODY SCREEN: CPT

## 2024-04-14 PROCEDURE — 88307 TISSUE EXAM BY PATHOLOGIST: CPT

## 2024-04-14 RX ORDER — IBUPROFEN 200 MG
1 TABLET ORAL
Qty: 0 | Refills: 0 | DISCHARGE
Start: 2024-04-14

## 2024-04-14 RX ORDER — ACETAMINOPHEN 500 MG
3 TABLET ORAL
Qty: 0 | Refills: 0 | DISCHARGE
Start: 2024-04-14

## 2024-04-14 RX ORDER — TETANUS TOXOID, REDUCED DIPHTHERIA TOXOID AND ACELLULAR PERTUSSIS VACCINE, ADSORBED 5; 2.5; 8; 8; 2.5 [IU]/.5ML; [IU]/.5ML; UG/.5ML; UG/.5ML; UG/.5ML
0.5 SUSPENSION INTRAMUSCULAR ONCE
Refills: 0 | Status: COMPLETED | OUTPATIENT
Start: 2024-04-14 | End: 2024-04-14

## 2024-04-14 RX ADMIN — Medication 1 APPLICATION(S): at 13:19

## 2024-04-14 RX ADMIN — Medication 600 MILLIGRAM(S): at 06:25

## 2024-04-14 RX ADMIN — POLYETHYLENE GLYCOL 3350 17 GRAM(S): 17 POWDER, FOR SOLUTION ORAL at 13:19

## 2024-04-14 RX ADMIN — Medication 1 TABLET(S): at 13:19

## 2024-04-14 RX ADMIN — Medication 600 MILLIGRAM(S): at 06:46

## 2024-04-14 RX ADMIN — Medication 600 MILLIGRAM(S): at 12:09

## 2024-04-14 RX ADMIN — Medication 975 MILLIGRAM(S): at 14:13

## 2024-04-14 RX ADMIN — Medication 975 MILLIGRAM(S): at 08:48

## 2024-04-14 NOTE — DISCHARGE NOTE OB - PATIENT PORTAL LINK FT
You can access the FollowMyHealth Patient Portal offered by [facility] by registering at the following website: [email or web address]. By joining [facility]’s FollowMyHealth portal, you will also be able to view your health information using other applications (apps) compatible with our system.

## 2024-04-14 NOTE — DISCHARGE NOTE OB - TEMPERATURE GREATER THAN 100.0  F ORALLY
house/yardwork, driving, computer work    Manual Treatments:  PROM / STM / Oscillations-Mobs:  G-I, II, III, IV (PA's, Inf., Post.)  [] (99176) Provided manual therapy to mobilize LE, proximal hip and/or LS spine soft tissue/joints for the purpose of modulating pain, promoting relaxation,  increasing ROM, reducing/eliminating soft tissue swelling/inflammation/restriction, improving soft tissue extensibility and allowing for proper ROM for normal function with   [] LE / lumbar: self care, mobility, lifting and ambulation. [] UE / Cervical: self care, reaching, carrying, lifting, house/yardwork, driving, computer work. Modalities:  [] (31106) Vasopneumatic compression: Utilized vasopneumatic compression to decrease edema / swelling for the purpose of improving mobility and quad tone / recruitment which will allow for increased overall function including but not limited to self-care, transfers, ambulation, and ascending / descending stairs. Charges:  Timed Code Treatment Minutes: 44   Total Treatment Minutes: 44     [] EVAL - LOW (85390)   [] EVAL - MOD (94967)  [] EVAL - HIGH (84458)  [] RE-EVAL (78446)  [x] RU(39809) x   1    [] Ionto  [x] NMR (87509) x   1    [] Vaso  [] Manual (12370) x       [] Ultrasound  [x] TA x 1     [] Mech Traction (47008)  [] Aquatic Therapy x     [] ES (un) (91390):   [] Home Management Training x  [] ES(attended) (80260)   [] Dry Needling 1-2 muscles (30793):  [] Dry Needling 3+ muscles (107616)  [] Group:      [] Other:     GOALS:   Short Term Goals: To be achieved in: 2 weeks  1. Independent in HEP and progression per patient tolerance, in order to prevent re-injury. [] Progressing: [x] Met: [] Not Met: [] Adjusted  2. Patient will have a decrease in pain to facilitate improvement in movement, function, and ADLs as indicated by improvement with respect to Functional Deficits. [] Progressing: [x] Met: [] Not Met: [] Adjusted     Long Term Goals:  To be achieved in: 4-6 Statement Selected

## 2024-04-14 NOTE — DISCHARGE NOTE OB - MEDICATION SUMMARY - MEDICATIONS TO TAKE
I will START or STAY ON the medications listed below when I get home from the hospital:    ibuprofen 600 mg oral tablet  -- 1 tab(s) by mouth every 6 hours  -- Indication: For pain    acetaminophen 325 mg oral tablet  -- 3 tab(s) by mouth every 6 hours  -- Indication: For pain    Prenatal Multivitamins with Folic Acid 1 mg oral tablet  -- 1 tab(s) by mouth once a day  -- Indication: For breastfeeding

## 2024-04-14 NOTE — PROGRESS NOTE ADULT - ASSESSMENT
A/P 38y s/p , PPD2 , stable, meeting postpartum milestones   - Pain: well controlled on tylenol/motrin  - GI: Tolerating regular diet  - : urinating without difficulty/pain  - DVT prophylaxis: ambulating frequently  - Dispo: PPD 2, unless otherwise specified

## 2024-04-14 NOTE — DISCHARGE NOTE OB - CARE PROVIDER_API CALL
Elijah Reza  Obstetrics and Gynecology  [address] [place] [id number]  Phone: [phone]  Fax: [phone]  Established Patient  Follow Up Time:

## 2024-04-14 NOTE — DISCHARGE NOTE OB - CARE PLAN
Principal Discharge DX:	Pregnancy, delivered  Assessment and plan of treatment:	Take Motrin 600mg every 6 hours and/or tylenol 650mg every 6 hours as needed for pain. Call your OB to schedule a follow up appointment in 6 weeks. Nothing per vagina until cleared by your OB - no intercourse, douching, tampons, etc.  Call your OB if you experience severe abdominal pain not improved by oral pain medications, heavy bright red vaginal bleeding saturating more than 1 pad per hour, or fever greater than 100.4F. Consider contraception options to be discussed with your OB.   1

## 2024-04-14 NOTE — PROGRESS NOTE ADULT - SUBJECTIVE AND OBJECTIVE BOX
Patient evaluated at bedside this morning, resting comfortable in bed, no acute events overnight.  She reports pain is well controlled with tylenol and motrin.  She denies heavy vaginal bleeding. She has been ambulating without assistance, voiding spontaneously.  Tolerating food well, without nausea/vomit.      Physical Exam:  T(C): 36.4 (04-13-24 @ 06:07), Max: 36.9 (04-12-24 @ 22:31)  HR: 74 (04-13-24 @ 06:07) (74 - 84)  BP: 120/76 (04-13-24 @ 06:07) (109/72 - 120/76)  RR: 18 (04-13-24 @ 06:07) (18 - 18)  SpO2: 98% (04-13-24 @ 06:07) (97% - 98%)    GA: comfortable-appearing, NAD  Pulm: no increased work of breathing  Abd: soft, nontender, no rebound or guarding, uterus firm.  Extremities: no calf tenderness            acetaminophen     Tablet .. 975 milliGRAM(s) Oral <User Schedule>  benzocaine 20%/menthol 0.5% Spray 1 Spray(s) Topical every 6 hours PRN  dextrose 5% + lactated ringers. 500 milliLiter(s) IV Continuous <Continuous>  dibucaine 1% Ointment 1 Application(s) Topical every 6 hours PRN  diphenhydrAMINE 25 milliGRAM(s) Oral every 6 hours PRN  diphtheria/tetanus/pertussis (acellular) Vaccine (Adacel) 0.5 milliLiter(s) IntraMuscular once  fentanyl (2 MICROgram(s)/mL) + bupivacaine 0.0625%  in 0.9% Sodium Chloride PCEA 250 milliLiter(s) Epidural PCA Continuous  hydrocortisone 1% Cream 1 Application(s) Topical every 6 hours PRN  ibuprofen  Tablet. 600 milliGRAM(s) Oral every 6 hours  lactated ringers. 500 milliLiter(s) IV Continuous <Continuous>  lanolin Ointment 1 Application(s) Topical every 6 hours PRN  magnesium hydroxide Suspension 30 milliLiter(s) Oral two times a day PRN  ondansetron Injectable 4 milliGRAM(s) IV Push once  oxyCODONE    IR 5 milliGRAM(s) Oral every 3 hours PRN  oxyCODONE    IR 5 milliGRAM(s) Oral once PRN  oxytocin Infusion 41.667 milliUNIT(s)/Min IV Continuous <Continuous>  pramoxine 1%/zinc 5% Cream 1 Application(s) Topical every 4 hours PRN  prenatal multivitamin 1 Tablet(s) Oral daily  simethicone 80 milliGRAM(s) Chew every 4 hours PRN  sodium chloride 0.9% lock flush 3 milliLiter(s) IV Push every 8 hours  witch hazel Pads 1 Application(s) Topical every 4 hours PRN  
Patient evaluated at bedside this morning, resting comfortable in bed, no acute events overnight.  She reports pain is well controlled with tylenol and motrin.  She denies nausea, vomiting, fever, chills, heavy vaginal bleeding. She has been ambulating without assistance, voiding spontaneously.  Tolerating food well, without nausea/vomit.      Physical Exam:  T(C): 36.5 (04-13-24 @ 18:25), Max: 36.5 (04-13-24 @ 18:25)  HR: 80 (04-13-24 @ 18:25) (80 - 80)  BP: 111/73 (04-13-24 @ 18:25) (111/73 - 111/73)  RR: 18 (04-13-24 @ 18:25) (18 - 18)  SpO2: 97% (04-13-24 @ 18:25) (97% - 97%)    GA: lying comfortably in bed, NAD  Pulm: no increased work of breathing  Abd: soft, nontender, nondistended, no rebound or guarding, uterus firm.  Extremities: no calf tenderness            acetaminophen     Tablet .. 975 milliGRAM(s) Oral <User Schedule>  benzocaine 20%/menthol 0.5% Spray 1 Spray(s) Topical every 6 hours PRN  dextrose 5% + lactated ringers. 500 milliLiter(s) IV Continuous <Continuous>  dibucaine 1% Ointment 1 Application(s) Topical every 6 hours PRN  diphenhydrAMINE 25 milliGRAM(s) Oral every 6 hours PRN  diphtheria/tetanus/pertussis (acellular) Vaccine (Adacel) 0.5 milliLiter(s) IntraMuscular once  hydrocortisone 1% Cream 1 Application(s) Topical every 6 hours PRN  ibuprofen  Tablet. 600 milliGRAM(s) Oral every 6 hours  lactated ringers. 500 milliLiter(s) IV Continuous <Continuous>  lanolin Ointment 1 Application(s) Topical every 6 hours PRN  magnesium hydroxide Suspension 30 milliLiter(s) Oral two times a day PRN  ondansetron Injectable 4 milliGRAM(s) IV Push once  oxyCODONE    IR 5 milliGRAM(s) Oral once PRN  oxyCODONE    IR 5 milliGRAM(s) Oral every 3 hours PRN  oxytocin Infusion 41.667 milliUNIT(s)/Min IV Continuous <Continuous>  polyethylene glycol 3350 17 Gram(s) Oral daily PRN  pramoxine 1%/zinc 5% Cream 1 Application(s) Topical every 4 hours PRN  prenatal multivitamin 1 Tablet(s) Oral daily  simethicone 80 milliGRAM(s) Chew every 4 hours PRN  sodium chloride 0.9% lock flush 3 milliLiter(s) IV Push every 8 hours  witch hazel Pads 1 Application(s) Topical every 4 hours PRN

## 2024-04-17 ENCOUNTER — APPOINTMENT (OUTPATIENT)
Age: 39
End: 2024-04-17
Payer: MEDICAID

## 2024-04-17 PROCEDURE — S9443A: CUSTOM

## 2024-04-17 PROCEDURE — S9445: CPT | Mod: 95

## 2024-04-18 LAB — SURGICAL PATHOLOGY STUDY: SIGNIFICANT CHANGE UP

## 2024-04-19 DIAGNOSIS — O43.123 VELAMENTOUS INSERTION OF UMBILICAL CORD, THIRD TRIMESTER: ICD-10-CM

## 2024-04-19 DIAGNOSIS — Z3A.40 40 WEEKS GESTATION OF PREGNANCY: ICD-10-CM

## 2024-04-19 DIAGNOSIS — Z34.83 ENCOUNTER FOR SUPERVISION OF OTHER NORMAL PREGNANCY, THIRD TRIMESTER: ICD-10-CM

## 2024-04-19 DIAGNOSIS — Z28.21 IMMUNIZATION NOT CARRIED OUT BECAUSE OF PATIENT REFUSAL: ICD-10-CM

## 2024-04-19 DIAGNOSIS — O48.0 POST-TERM PREGNANCY: ICD-10-CM

## 2025-05-27 NOTE — OB RN TRIAGE NOTE - TEMPERATURE IN FAHRENHEIT (DEGREES F)
Quality 130: Documentation Of Current Medications In The Medical Record: Current Medications Documented Detail Level: Detailed Quality 226: Preventive Care And Screening: Tobacco Use: Screening And Cessation Intervention: Patient screened for tobacco use and is an ex/non-smoker 98.1

## 2025-06-10 ENCOUNTER — APPOINTMENT (OUTPATIENT)
Dept: ANTEPARTUM | Facility: CLINIC | Age: 40
End: 2025-06-10

## 2025-08-08 ENCOUNTER — APPOINTMENT (OUTPATIENT)
Dept: ANTEPARTUM | Facility: CLINIC | Age: 40
End: 2025-08-08
Payer: MEDICAID

## 2025-08-08 ENCOUNTER — ASOB RESULT (OUTPATIENT)
Age: 40
End: 2025-08-08

## 2025-08-08 PROCEDURE — 76811 OB US DETAILED SNGL FETUS: CPT

## 2025-08-08 PROCEDURE — 76817 TRANSVAGINAL US OBSTETRIC: CPT
